# Patient Record
Sex: MALE | Race: WHITE
[De-identification: names, ages, dates, MRNs, and addresses within clinical notes are randomized per-mention and may not be internally consistent; named-entity substitution may affect disease eponyms.]

---

## 2019-01-07 ENCOUNTER — HOSPITAL ENCOUNTER (EMERGENCY)
Dept: HOSPITAL 91 - FTE | Age: 24
Discharge: HOME | End: 2019-01-07
Payer: COMMERCIAL

## 2019-01-07 ENCOUNTER — HOSPITAL ENCOUNTER (EMERGENCY)
Dept: HOSPITAL 10 - FTE | Age: 24
Discharge: HOME | End: 2019-01-07
Payer: COMMERCIAL

## 2019-01-07 VITALS — SYSTOLIC BLOOD PRESSURE: 149 MMHG | DIASTOLIC BLOOD PRESSURE: 91 MMHG | RESPIRATION RATE: 16 BRPM | HEART RATE: 78 BPM

## 2019-01-07 VITALS
BODY MASS INDEX: 32.18 KG/M2 | HEIGHT: 68 IN | HEIGHT: 68 IN | BODY MASS INDEX: 32.18 KG/M2 | WEIGHT: 212.31 LBS | WEIGHT: 212.31 LBS

## 2019-01-07 DIAGNOSIS — R11.2: Primary | ICD-10-CM

## 2019-01-07 DIAGNOSIS — F17.210: ICD-10-CM

## 2019-01-07 LAB
ADD MAN DIFF?: NO
ADD UMIC: YES
ALANINE AMINOTRANSFERASE: 51 IU/L (ref 13–69)
ALBUMIN/GLOBULIN RATIO: 1.3
ALBUMIN: 5.1 G/DL (ref 3.3–4.9)
ALKALINE PHOSPHATASE: 108 IU/L (ref 42–121)
ANION GAP: 17 (ref 5–13)
ASPARTATE AMINO TRANSFERASE: 27 IU/L (ref 15–46)
BASOPHIL #: 0 10^3/UL (ref 0–0.1)
BASOPHILS %: 0.3 % (ref 0–2)
BILIRUBIN,DIRECT: 0 MG/DL (ref 0–0.2)
BILIRUBIN,TOTAL: 0.6 MG/DL (ref 0.2–1.3)
BLOOD UREA NITROGEN: 9 MG/DL (ref 7–20)
CALCIUM: 10.1 MG/DL (ref 8.4–10.2)
CARBON DIOXIDE: 28 MMOL/L (ref 21–31)
CHLORIDE: 102 MMOL/L (ref 97–110)
CREATININE: 0.79 MG/DL (ref 0.61–1.24)
EOSINOPHILS #: 0 10^3/UL (ref 0–0.5)
EOSINOPHILS %: 0 % (ref 0–7)
GLOBULIN: 3.9 G/DL (ref 1.3–3.2)
GLUCOSE: 111 MG/DL (ref 70–220)
HEMATOCRIT: 46.8 % (ref 42–52)
HEMOGLOBIN: 15.6 G/DL (ref 14–18)
IMMATURE GRANS #M: 0.04 10^3/UL (ref 0–0.03)
IMMATURE GRANS % (M): 0.3 % (ref 0–0.43)
LIPASE: 39 U/L (ref 23–300)
LYMPHOCYTES #: 2.4 10^3/UL (ref 0.8–2.9)
LYMPHOCYTES %: 18.4 % (ref 15–51)
MEAN CORPUSCULAR HEMOGLOBIN: 28.6 PG (ref 29–33)
MEAN CORPUSCULAR HGB CONC: 33.3 G/DL (ref 32–37)
MEAN CORPUSCULAR VOLUME: 85.9 FL (ref 82–101)
MEAN PLATELET VOLUME: 9.2 FL (ref 7.4–10.4)
MONOCYTE #: 1.3 10^3/UL (ref 0.3–0.9)
MONOCYTES %: 9.6 % (ref 0–11)
NEUTROPHIL #: 9.3 10^3/UL (ref 1.6–7.5)
NEUTROPHILS %: 71.4 % (ref 39–77)
NUCLEATED RED BLOOD CELLS #: 0 10^3/UL (ref 0–0)
NUCLEATED RED BLOOD CELLS%: 0 /100WBC (ref 0–0)
PLATELET COUNT: 399 10^3/UL (ref 140–415)
POTASSIUM: 3.5 MMOL/L (ref 3.5–5.1)
RED BLOOD COUNT: 5.45 10^6/UL (ref 4.7–6.1)
RED CELL DISTRIBUTION WIDTH: 12.8 % (ref 11.5–14.5)
SODIUM: 147 MMOL/L (ref 135–144)
TOTAL PROTEIN: 9 G/DL (ref 6.1–8.1)
UR ASCORBIC ACID: NEGATIVE MG/DL
UR BILIRUBIN (DIP): NEGATIVE MG/DL
UR BLOOD (DIP): NEGATIVE MG/DL
UR CLARITY: CLEAR
UR COLOR: YELLOW
UR GLUCOSE (DIP): NEGATIVE MG/DL
UR KETONES (DIP): (no result) MG/DL
UR LEUKOCYTE ESTERASE (DIP): NEGATIVE LEU/UL
UR NITRITE (DIP): NEGATIVE MG/DL
UR PH (DIP): 8 (ref 5–9)
UR RBC: 1 /HPF (ref 0–5)
UR SPECIFIC GRAVITY (DIP): 1.02 (ref 1–1.03)
UR TOTAL PROTEIN (DIP): (no result) MG/DL
UR UROBILINOGEN (DIP): NEGATIVE MG/DL
UR WBC: 0 /HPF (ref 0–5)
WHITE BLOOD COUNT: 13 10^3/UL (ref 4.8–10.8)

## 2019-01-07 PROCEDURE — 99285 EMERGENCY DEPT VISIT HI MDM: CPT

## 2019-01-07 PROCEDURE — 36415 COLL VENOUS BLD VENIPUNCTURE: CPT

## 2019-01-07 PROCEDURE — 80053 COMPREHEN METABOLIC PANEL: CPT

## 2019-01-07 PROCEDURE — 96374 THER/PROPH/DIAG INJ IV PUSH: CPT

## 2019-01-07 PROCEDURE — 76705 ECHO EXAM OF ABDOMEN: CPT

## 2019-01-07 PROCEDURE — 85025 COMPLETE CBC W/AUTO DIFF WBC: CPT

## 2019-01-07 PROCEDURE — 83690 ASSAY OF LIPASE: CPT

## 2019-01-07 PROCEDURE — 96361 HYDRATE IV INFUSION ADD-ON: CPT

## 2019-01-07 PROCEDURE — 81001 URINALYSIS AUTO W/SCOPE: CPT

## 2019-01-07 RX ADMIN — ONDANSETRON HYDROCHLORIDE 1 MG: 2 INJECTION, SOLUTION INTRAMUSCULAR; INTRAVENOUS at 02:42

## 2019-01-07 RX ADMIN — THIAMINE HYDROCHLORIDE 1 MLS/HR: 100 INJECTION, SOLUTION INTRAMUSCULAR; INTRAVENOUS at 02:42

## 2019-01-07 NOTE — ERD
ER Documentation


Chief Complaint


Chief Complaint





STATES VOMITING BLOOD; TODAY; DRANK HEAVILY NIGHT BEFORE





HPI


23-year-old male presenting with vomiting times 1 day.  Patient is describing 


some epigastric pain.  He has a history of gastritis.  He took Zofran earlier 


today but then vomited up.  Patient has been vomiting for the last 8 hours.  


Denies any chest pain or shortness of breath.  Denies any changes in urination 


or bowel movement.  Denies other medical problems.  NKDA.  Surgical history 


denies.  Social history denies





ROS


All systems reviewed and are negative except as per history of present illness.





Medications


Home Meds


Active Scripts


Acetaminophen* (Tylophen*) 500 Mg Capsule, 2 CAP PO Q8H PRN for PAIN AND OR 


ELEVATED TEMP, #20 CAP


   Prov:DINORAH LUCAS PA-C         19


Famotidine* (Pepcid*) 20 Mg Tablet, 20 MG PO BID for 4 Days, #30 TAB


   Prov:DINORAH LUCAS PA-C         19


Ondansetron (Ondansetron Odt) 4 Mg Tab.rapdis, 4 MG PO Q6H PRN for NAUSEA AND/OR


VOMITING, #10 TAB


   Prov:DINORAH LUCAS PA-C         19


Reported Medications


[Gas X]   No Conflict Check, PO DAILY PRN


   12





Allergies


Allergies:  


Coded Allergies:  


     No Known Drug Allergies (Verified  Allergy, Unknown, 19)





PMhx/Soc


History of Surgery:  No


Anesthesia Reaction:  No


Hx Neurological Disorder:  No


Hx Respiratory Disorders:  No


Hx Cardiac Disorders:  No


Hx Psychiatric Problems:  No


Hx Miscellaneous Medical Probl:  Yes (gerd)


Hx Alcohol Use:  No


Hx Substance Use:  No


Hx Tobacco Use:  No


Smoking Status:  Current some day smoker





FmHx


Family History:  No diabetes, No coronary disease, No other





Physical Exam


Vitals





Vital Signs


  Date      Temp  Pulse  Resp  B/P (MAP)   Pulse Ox  O2          O2 Flow    FiO2


Time                                                 Delivery    Rate


    19  98.1     78    16      149/91        99  Room Air


     03:39                          (110)


    19  98.8    110    18      144/80        99


     01:10                          (101)





Physical Exam


GENERAL: The patient is well-appearing, well-nourished, in no acute distress


CHEST: Clear to auscultation bilaterally.  There are no rales, wheezes or 


rhonchi.


HEART: Regular rate and rhythm.  No murmurs, clicks, rubs or gallops.  No S3 or 


S4.


ABDOMEN:Soft, nondistended.  Mild tenderness palpation in epigastric region with


no rebound tenderness.  No distention.  No organomegaly.


Result Diagram:  


19 0236                                                                     


          19 0236





Results 24 hrs





Laboratory Tests


              Test
                                   19
02:36


              White Blood Count                      13.0 10^3/ul


              Red Blood Count                        5.45 10^6/ul


              Hemoglobin                                15.6 g/dl


              Hematocrit                                   46.8 %


              Mean Corpuscular Volume                     85.9 fl


              Mean Corpuscular Hemoglobin                 28.6 pg


              Mean Corpuscular Hemoglobin
Concent      33.3 g/dl 



              Red Cell Distribution Width                  12.8 %


              Platelet Count                          399 10^3/UL


              Mean Platelet Volume                         9.2 fl


              Immature Granulocytes %                     0.300 %


              Neutrophils %                                71.4 %


              Lymphocytes %                                18.4 %


              Monocytes %                                   9.6 %


              Eosinophils %                                 0.0 %


              Basophils %                                   0.3 %


              Nucleated Red Blood Cells %             0.0 /100WBC


              Immature Granulocytes #               0.040 10^3/ul


              Neutrophils #                           9.3 10^3/ul


              Lymphocytes #                           2.4 10^3/ul


              Monocytes #                             1.3 10^3/ul


              Eosinophils #                           0.0 10^3/ul


              Basophils #                             0.0 10^3/ul


              Nucleated Red Blood Cells #             0.0 10^3/ul


              Urine Color                          YELLOW


              Urine Clarity                        CLEAR


              Urine pH                                        8.0


              Urine Specific Gravity                        1.024


              Urine Ketones                        TRACE mg/dL


              Urine Nitrite                        NEGATIVE mg/dL


              Urine Bilirubin                      NEGATIVE mg/dL


              Urine Urobilinogen                   NEGATIVE mg/dL


              Urine Leukocyte Esterase
            NEGATIVE
Jenn/ul


              Urine Microscopic RBC                        1 /HPF


              Urine Microscopic WBC                        0 /HPF


              Urine Hemoglobin                     NEGATIVE mg/dL


              Urine Glucose                        NEGATIVE mg/dL


              Urine Total Protein                        2+ mg/dl


              Sodium Level                             147 mmol/L


              Potassium Level                          3.5 mmol/L


              Chloride Level                           102 mmol/L


              Carbon Dioxide Level                      28 mmol/L


              Anion Gap                                        17


              Blood Urea Nitrogen                         9 mg/dl


              Creatinine                               0.79 mg/dl


              Est Glomerular Filtrat Rate
mL/min   > 60 mL/min 



              Glucose Level                             111 mg/dl


              Calcium Level                            10.1 mg/dl


              Total Bilirubin                           0.6 mg/dl


              Direct Bilirubin                         0.00 mg/dl


              Indirect Bilirubin                        0.6 mg/dl


              Aspartate Amino Transf
(AST/SGOT)          27 IU/L 



              Alanine Aminotransferase
(ALT/SGPT)        51 IU/L 



              Alkaline Phosphatase                       108 IU/L


              Total Protein                              9.0 g/dl


              Albumin                                    5.1 g/dl


              Globulin                                  3.90 g/dl


              Albumin/Globulin Ratio                         1.30


              Lipase                                       39 U/L





Current Medications


 Medications
   Dose
          Sig/Jean-Claude
       Start Time
   Status  Last


 (Trade)       Ordered        Route
 PRN     Stop Time              Admin
Dose


                              Reason                                Admin


 Sodium         1,000 ml @ 
   Q1H STAT
      19        DC            19


Chloride       1,000 mls/hr   IV
            02:24
 19                02:42



                                             03:23


 Morphine       4 mg           ONCE  STAT
    19        DC       



Sulfate
                      IV
            02:24
 19


(morphine)                                   02:26


 Ondansetron    4 mg           ONCE  STAT
    19        DC            19


HCl
  (Zofran                 IV
            02:24
 19                02:42



Inj)                                         02:26








Procedures/MDM


                            DIAGNOSTIC IMAGING REPORT





Patient: AUGUSTINE LERNER   : 1995   Age: 23  Sex: M                   


    


       MR #:    [id number]   Acct #:   [id number]    DOS: 19 0224


Ordering MD: WILL LUCAS PA-C   Location:  [facility]   Room/Bed:            


                               


                                        


PROCEDURE:   US gallbladder . 


 


CLINICAL INDICATION:   Abdominal pain


 


TECHNIQUE:   Multiple real-time images were acquired of the patient's abdomen 


utilizing a high resolution transducer. 


 


COMPARISON:  None


 


FINDINGS:


 


There is no evidence of cholelithiasis. There is no gallbladder wall thickening 


or pericholecystic fluid.


 


The CBD measures 4 mm.


 


No sonographic Schafer's sign was elicited.


 


Visualized portions of the liver and right kidney are unremarkable. The pancreas


is poorly visualized.


 


IMPRESSION:


Unremarkable gallbladder ultrasound. 


 





MDM: 22-year-old male presents with epigastric pain.  I have low suspicion for 


choledocholithiasis, cholecystitis, cholangitis or pancreatitis.  Low suspicion 


for cardiac or pulmonary emergency.  I have low suspicion for other abdominal 


emergencies.  Patient is discharged stricter precautions and told to follow-up 


with primary care within 1-2 days for close evaluation.  Patient is told if 


symptoms change or worsen to immediately return to the ER.  All questions answ


ered at discharge





Departure


Diagnosis:  


   Primary Impression:  


   Nausea and vomiting


Condition:  Stable


Patient Instructions:  Nausea and Vomiting-Adult


Referrals:  


[facility]


YOU HAVE RECEIVED A MEDICAL SCREENING EXAM AND THE RESULTS INDICATE THAT YOU DO 


NOT HAVE A CONDITION THAT REQUIRES URGENT TREATMENT IN THE EMERGENCY DEPARTMENT.





FURTHER EVALUATION AND TREATMENT OF YOUR CONDITION CAN WAIT UNTIL YOU ARE SEEN 


IN YOUR DOCTORS OFFICE WITHIN THE NEXT 1-2 DAYS. IT IS YOUR RESPONSIBILITY TO 


MAKE AN APPOINTMENT FOR FOLOW-UP CARE.





IF YOU HAVE A PRIMARY DOCTOR


--you should call your primary doctor and schedule an appointment





IF YOU DO NOT HAVE A PRIMARY DOCTOR YOU CAN CALL OUR PHYSICIAN REFERRAL HOTLINE 


AT


 [phone] 





IF YOU CAN NOT AFFORD TO SEE A PHYSICIAN YOU CAN CHOSE FROM THE FOLLOWING 


[facility] CLINICS





[facility] [phone] [address] [facility]. [facility] [phone] [address] [facility]. [facility] [phone] [address] [facility]. [facility] [phone] [address] [facility]. [facility] [phone] [address] [facility]. [facility]. [phone]


[address]





Additional Instructions:  


FOLLOW UP WITH YOUR PRIMARY CARE PHYSICIAN TOMORROW.Return to this facility if 


you are not improving as expected.











DINORAH LUACS PA-C        2019 04:12